# Patient Record
Sex: MALE | Race: WHITE | NOT HISPANIC OR LATINO | Employment: FULL TIME | URBAN - METROPOLITAN AREA
[De-identification: names, ages, dates, MRNs, and addresses within clinical notes are randomized per-mention and may not be internally consistent; named-entity substitution may affect disease eponyms.]

---

## 2020-03-26 ENCOUNTER — APPOINTMENT (OUTPATIENT)
Dept: RADIOLOGY | Facility: MEDICAL CENTER | Age: 40
End: 2020-03-26
Attending: NURSE PRACTITIONER
Payer: COMMERCIAL

## 2020-04-02 ENCOUNTER — HOSPITAL ENCOUNTER (OUTPATIENT)
Dept: RADIOLOGY | Facility: MEDICAL CENTER | Age: 40
End: 2020-04-02
Attending: NURSE PRACTITIONER
Payer: COMMERCIAL

## 2020-04-02 DIAGNOSIS — M54.50 LOW BACK PAIN, UNSPECIFIED BACK PAIN LATERALITY, UNSPECIFIED CHRONICITY, UNSPECIFIED WHETHER SCIATICA PRESENT: ICD-10-CM

## 2020-04-02 PROCEDURE — 72148 MRI LUMBAR SPINE W/O DYE: CPT

## 2024-02-29 ENCOUNTER — APPOINTMENT (OUTPATIENT)
Dept: RADIOLOGY | Facility: MEDICAL CENTER | Age: 44
End: 2024-02-29
Attending: PHYSICIAN ASSISTANT
Payer: MEDICAID

## 2024-02-29 DIAGNOSIS — M25.362 OTHER INSTABILITY, LEFT KNEE: ICD-10-CM

## 2024-02-29 PROCEDURE — 73721 MRI JNT OF LWR EXTRE W/O DYE: CPT | Mod: LT

## 2024-03-18 ENCOUNTER — APPOINTMENT (OUTPATIENT)
Dept: ADMISSIONS | Facility: MEDICAL CENTER | Age: 44
End: 2024-03-18
Attending: STUDENT IN AN ORGANIZED HEALTH CARE EDUCATION/TRAINING PROGRAM
Payer: MEDICAID

## 2024-03-20 ENCOUNTER — PRE-ADMISSION TESTING (OUTPATIENT)
Dept: ADMISSIONS | Facility: MEDICAL CENTER | Age: 44
End: 2024-03-20
Attending: STUDENT IN AN ORGANIZED HEALTH CARE EDUCATION/TRAINING PROGRAM
Payer: MEDICAID

## 2024-03-20 VITALS — BODY MASS INDEX: 31.6 KG/M2 | HEIGHT: 72 IN

## 2024-03-20 RX ORDER — ACETAMINOPHEN 500 MG
500-1000 TABLET ORAL PRN
COMMUNITY

## 2024-03-20 RX ORDER — IBUPROFEN 400 MG/1
400 TABLET ORAL PRN
COMMUNITY

## 2024-03-20 NOTE — PREPROCEDURE INSTRUCTIONS
PreAdmit Telephone Appointment: Reviewed the Preparing for your procedure handout with patient over the phone. Patient instructed per pharmacy guidelines regarding taking, holding or contacting provider for instructions on regularly prescribed medications before surgery. Instructed to take the following medications the day of surgery with a sip of water per pharmacy medication guidelines:   tylenol    Confirmed with patient where to check in morning of surgery. Handouts/Brochure/Video emailed to patient.

## 2024-03-25 ENCOUNTER — HOSPITAL ENCOUNTER (OUTPATIENT)
Facility: MEDICAL CENTER | Age: 44
End: 2024-03-25
Attending: STUDENT IN AN ORGANIZED HEALTH CARE EDUCATION/TRAINING PROGRAM | Admitting: STUDENT IN AN ORGANIZED HEALTH CARE EDUCATION/TRAINING PROGRAM
Payer: MEDICAID

## 2024-03-25 ENCOUNTER — ANESTHESIA (OUTPATIENT)
Dept: SURGERY | Facility: MEDICAL CENTER | Age: 44
End: 2024-03-25
Payer: MEDICAID

## 2024-03-25 ENCOUNTER — PHARMACY VISIT (OUTPATIENT)
Dept: PHARMACY | Facility: MEDICAL CENTER | Age: 44
End: 2024-03-25
Payer: COMMERCIAL

## 2024-03-25 ENCOUNTER — ANESTHESIA EVENT (OUTPATIENT)
Dept: SURGERY | Facility: MEDICAL CENTER | Age: 44
End: 2024-03-25
Payer: MEDICAID

## 2024-03-25 VITALS
HEIGHT: 72 IN | TEMPERATURE: 96.8 F | DIASTOLIC BLOOD PRESSURE: 76 MMHG | WEIGHT: 261.36 LBS | BODY MASS INDEX: 35.4 KG/M2 | OXYGEN SATURATION: 90 % | RESPIRATION RATE: 16 BRPM | SYSTOLIC BLOOD PRESSURE: 127 MMHG | HEART RATE: 65 BPM

## 2024-03-25 PROCEDURE — 160009 HCHG ANES TIME/MIN: Performed by: STUDENT IN AN ORGANIZED HEALTH CARE EDUCATION/TRAINING PROGRAM

## 2024-03-25 PROCEDURE — 160025 RECOVERY II MINUTES (STATS): Performed by: STUDENT IN AN ORGANIZED HEALTH CARE EDUCATION/TRAINING PROGRAM

## 2024-03-25 PROCEDURE — 160029 HCHG SURGERY MINUTES - 1ST 30 MINS LEVEL 4: Performed by: STUDENT IN AN ORGANIZED HEALTH CARE EDUCATION/TRAINING PROGRAM

## 2024-03-25 PROCEDURE — RXMED WILLOW AMBULATORY MEDICATION CHARGE: Performed by: STUDENT IN AN ORGANIZED HEALTH CARE EDUCATION/TRAINING PROGRAM

## 2024-03-25 PROCEDURE — 160002 HCHG RECOVERY MINUTES (STAT): Performed by: STUDENT IN AN ORGANIZED HEALTH CARE EDUCATION/TRAINING PROGRAM

## 2024-03-25 PROCEDURE — 160035 HCHG PACU - 1ST 60 MINS PHASE I: Performed by: STUDENT IN AN ORGANIZED HEALTH CARE EDUCATION/TRAINING PROGRAM

## 2024-03-25 PROCEDURE — 160041 HCHG SURGERY MINUTES - EA ADDL 1 MIN LEVEL 4: Performed by: STUDENT IN AN ORGANIZED HEALTH CARE EDUCATION/TRAINING PROGRAM

## 2024-03-25 PROCEDURE — 160048 HCHG OR STATISTICAL LEVEL 1-5: Performed by: STUDENT IN AN ORGANIZED HEALTH CARE EDUCATION/TRAINING PROGRAM

## 2024-03-25 PROCEDURE — 700111 HCHG RX REV CODE 636 W/ 250 OVERRIDE (IP): Mod: JZ | Performed by: ANESTHESIOLOGY

## 2024-03-25 PROCEDURE — 700101 HCHG RX REV CODE 250: Performed by: STUDENT IN AN ORGANIZED HEALTH CARE EDUCATION/TRAINING PROGRAM

## 2024-03-25 PROCEDURE — 160036 HCHG PACU - EA ADDL 30 MINS PHASE I: Performed by: STUDENT IN AN ORGANIZED HEALTH CARE EDUCATION/TRAINING PROGRAM

## 2024-03-25 PROCEDURE — 700105 HCHG RX REV CODE 258: Performed by: STUDENT IN AN ORGANIZED HEALTH CARE EDUCATION/TRAINING PROGRAM

## 2024-03-25 PROCEDURE — A9270 NON-COVERED ITEM OR SERVICE: HCPCS | Performed by: ANESTHESIOLOGY

## 2024-03-25 PROCEDURE — 700102 HCHG RX REV CODE 250 W/ 637 OVERRIDE(OP): Performed by: ANESTHESIOLOGY

## 2024-03-25 PROCEDURE — 160046 HCHG PACU - 1ST 60 MINS PHASE II: Performed by: STUDENT IN AN ORGANIZED HEALTH CARE EDUCATION/TRAINING PROGRAM

## 2024-03-25 PROCEDURE — 700101 HCHG RX REV CODE 250: Performed by: ANESTHESIOLOGY

## 2024-03-25 RX ORDER — MEPERIDINE HYDROCHLORIDE 25 MG/ML
12.5 INJECTION INTRAMUSCULAR; INTRAVENOUS; SUBCUTANEOUS
Status: DISCONTINUED | OUTPATIENT
Start: 2024-03-25 | End: 2024-03-25 | Stop reason: HOSPADM

## 2024-03-25 RX ORDER — OXYCODONE HYDROCHLORIDE 5 MG/1
TABLET ORAL
Qty: 17 TABLET | Refills: 0 | OUTPATIENT
Start: 2024-03-25

## 2024-03-25 RX ORDER — CEFAZOLIN SODIUM 1 G/3ML
INJECTION, POWDER, FOR SOLUTION INTRAMUSCULAR; INTRAVENOUS PRN
Status: DISCONTINUED | OUTPATIENT
Start: 2024-03-25 | End: 2024-03-25 | Stop reason: SURG

## 2024-03-25 RX ORDER — LIDOCAINE HYDROCHLORIDE 20 MG/ML
INJECTION, SOLUTION EPIDURAL; INFILTRATION; INTRACAUDAL; PERINEURAL PRN
Status: DISCONTINUED | OUTPATIENT
Start: 2024-03-25 | End: 2024-03-25 | Stop reason: SURG

## 2024-03-25 RX ORDER — ASPIRIN 81 MG/1
TABLET ORAL
Qty: 84 TABLET | Refills: 0 | OUTPATIENT
Start: 2024-03-25

## 2024-03-25 RX ORDER — BUPIVACAINE HYDROCHLORIDE AND EPINEPHRINE 2.5; 5 MG/ML; UG/ML
INJECTION, SOLUTION EPIDURAL; INFILTRATION; INTRACAUDAL; PERINEURAL
Status: DISCONTINUED | OUTPATIENT
Start: 2024-03-25 | End: 2024-03-25 | Stop reason: HOSPADM

## 2024-03-25 RX ORDER — LABETALOL HYDROCHLORIDE 5 MG/ML
5 INJECTION, SOLUTION INTRAVENOUS
Status: DISCONTINUED | OUTPATIENT
Start: 2024-03-25 | End: 2024-03-25 | Stop reason: HOSPADM

## 2024-03-25 RX ORDER — MELOXICAM 15 MG/1
TABLET ORAL
Qty: 7 TABLET | Refills: 0 | OUTPATIENT
Start: 2024-03-25

## 2024-03-25 RX ORDER — HYDROMORPHONE HYDROCHLORIDE 1 MG/ML
0.4 INJECTION, SOLUTION INTRAMUSCULAR; INTRAVENOUS; SUBCUTANEOUS
Status: DISCONTINUED | OUTPATIENT
Start: 2024-03-25 | End: 2024-03-25 | Stop reason: HOSPADM

## 2024-03-25 RX ORDER — HYDRALAZINE HYDROCHLORIDE 20 MG/ML
5 INJECTION INTRAMUSCULAR; INTRAVENOUS
Status: DISCONTINUED | OUTPATIENT
Start: 2024-03-25 | End: 2024-03-25 | Stop reason: HOSPADM

## 2024-03-25 RX ORDER — IPRATROPIUM BROMIDE AND ALBUTEROL SULFATE 2.5; .5 MG/3ML; MG/3ML
3 SOLUTION RESPIRATORY (INHALATION)
Status: DISCONTINUED | OUTPATIENT
Start: 2024-03-25 | End: 2024-03-25 | Stop reason: HOSPADM

## 2024-03-25 RX ORDER — ONDANSETRON 8 MG/1
TABLET, ORALLY DISINTEGRATING ORAL
Qty: 10 TABLET | Refills: 0 | OUTPATIENT
Start: 2024-03-25

## 2024-03-25 RX ORDER — ONDANSETRON 2 MG/ML
4 INJECTION INTRAMUSCULAR; INTRAVENOUS
Status: DISCONTINUED | OUTPATIENT
Start: 2024-03-25 | End: 2024-03-25 | Stop reason: HOSPADM

## 2024-03-25 RX ORDER — OXYCODONE HCL 5 MG/5 ML
5 SOLUTION, ORAL ORAL
Status: COMPLETED | OUTPATIENT
Start: 2024-03-25 | End: 2024-03-25

## 2024-03-25 RX ORDER — SODIUM CHLORIDE, SODIUM LACTATE, POTASSIUM CHLORIDE, CALCIUM CHLORIDE 600; 310; 30; 20 MG/100ML; MG/100ML; MG/100ML; MG/100ML
INJECTION, SOLUTION INTRAVENOUS CONTINUOUS
Status: DISCONTINUED | OUTPATIENT
Start: 2024-03-25 | End: 2024-03-25

## 2024-03-25 RX ORDER — KETOROLAC TROMETHAMINE 15 MG/ML
INJECTION, SOLUTION INTRAMUSCULAR; INTRAVENOUS PRN
Status: DISCONTINUED | OUTPATIENT
Start: 2024-03-25 | End: 2024-03-25 | Stop reason: SURG

## 2024-03-25 RX ORDER — HYDROMORPHONE HYDROCHLORIDE 1 MG/ML
0.2 INJECTION, SOLUTION INTRAMUSCULAR; INTRAVENOUS; SUBCUTANEOUS
Status: DISCONTINUED | OUTPATIENT
Start: 2024-03-25 | End: 2024-03-25 | Stop reason: HOSPADM

## 2024-03-25 RX ORDER — HALOPERIDOL 5 MG/ML
1 INJECTION INTRAMUSCULAR
Status: DISCONTINUED | OUTPATIENT
Start: 2024-03-25 | End: 2024-03-25 | Stop reason: HOSPADM

## 2024-03-25 RX ORDER — OXYCODONE HCL 5 MG/5 ML
10 SOLUTION, ORAL ORAL
Status: COMPLETED | OUTPATIENT
Start: 2024-03-25 | End: 2024-03-25

## 2024-03-25 RX ORDER — MIDAZOLAM HYDROCHLORIDE 1 MG/ML
1 INJECTION INTRAMUSCULAR; INTRAVENOUS
Status: DISCONTINUED | OUTPATIENT
Start: 2024-03-25 | End: 2024-03-25 | Stop reason: HOSPADM

## 2024-03-25 RX ORDER — SODIUM CHLORIDE, SODIUM LACTATE, POTASSIUM CHLORIDE, CALCIUM CHLORIDE 600; 310; 30; 20 MG/100ML; MG/100ML; MG/100ML; MG/100ML
INJECTION, SOLUTION INTRAVENOUS CONTINUOUS
Status: DISCONTINUED | OUTPATIENT
Start: 2024-03-25 | End: 2024-03-25 | Stop reason: HOSPADM

## 2024-03-25 RX ORDER — ONDANSETRON 2 MG/ML
INJECTION INTRAMUSCULAR; INTRAVENOUS PRN
Status: DISCONTINUED | OUTPATIENT
Start: 2024-03-25 | End: 2024-03-25 | Stop reason: SURG

## 2024-03-25 RX ORDER — DEXAMETHASONE SODIUM PHOSPHATE 4 MG/ML
INJECTION, SOLUTION INTRA-ARTICULAR; INTRALESIONAL; INTRAMUSCULAR; INTRAVENOUS; SOFT TISSUE PRN
Status: DISCONTINUED | OUTPATIENT
Start: 2024-03-25 | End: 2024-03-25 | Stop reason: SURG

## 2024-03-25 RX ORDER — BISACODYL 5 MG
TABLET, DELAYED RELEASE (ENTERIC COATED) ORAL
Qty: 4 TABLET | Refills: 0 | OUTPATIENT
Start: 2024-03-25

## 2024-03-25 RX ORDER — SENNOSIDES 8.6 MG
CAPSULE ORAL
Qty: 42 TABLET | Refills: 0 | OUTPATIENT
Start: 2024-03-25

## 2024-03-25 RX ORDER — HYDROMORPHONE HYDROCHLORIDE 1 MG/ML
0.1 INJECTION, SOLUTION INTRAMUSCULAR; INTRAVENOUS; SUBCUTANEOUS
Status: DISCONTINUED | OUTPATIENT
Start: 2024-03-25 | End: 2024-03-25 | Stop reason: HOSPADM

## 2024-03-25 RX ADMIN — FENTANYL CITRATE 100 MCG: 50 INJECTION, SOLUTION INTRAMUSCULAR; INTRAVENOUS at 12:24

## 2024-03-25 RX ADMIN — FENTANYL CITRATE 25 MCG: 50 INJECTION, SOLUTION INTRAMUSCULAR; INTRAVENOUS at 13:52

## 2024-03-25 RX ADMIN — ONDANSETRON 4 MG: 2 INJECTION INTRAMUSCULAR; INTRAVENOUS at 12:25

## 2024-03-25 RX ADMIN — OXYCODONE HYDROCHLORIDE 5 MG: 5 SOLUTION ORAL at 13:28

## 2024-03-25 RX ADMIN — LIDOCAINE HYDROCHLORIDE 100 MG: 20 INJECTION, SOLUTION EPIDURAL; INFILTRATION; INTRACAUDAL at 12:25

## 2024-03-25 RX ADMIN — KETOROLAC TROMETHAMINE 15 MG: 15 INJECTION, SOLUTION INTRAMUSCULAR; INTRAVENOUS at 12:59

## 2024-03-25 RX ADMIN — CEFAZOLIN 3 G: 1 INJECTION, POWDER, FOR SOLUTION INTRAMUSCULAR; INTRAVENOUS at 12:29

## 2024-03-25 RX ADMIN — SODIUM CHLORIDE, POTASSIUM CHLORIDE, SODIUM LACTATE AND CALCIUM CHLORIDE: 600; 310; 30; 20 INJECTION, SOLUTION INTRAVENOUS at 11:12

## 2024-03-25 RX ADMIN — DEXAMETHASONE SODIUM PHOSPHATE 8 MG: 4 INJECTION INTRA-ARTICULAR; INTRALESIONAL; INTRAMUSCULAR; INTRAVENOUS; SOFT TISSUE at 12:25

## 2024-03-25 RX ADMIN — PROPOFOL 250 MG: 10 INJECTION, EMULSION INTRAVENOUS at 12:25

## 2024-03-25 ASSESSMENT — PAIN DESCRIPTION - PAIN TYPE
TYPE: CHRONIC PAIN
TYPE: SURGICAL PAIN

## 2024-03-25 ASSESSMENT — PAIN SCALES - GENERAL: PAIN_LEVEL: 0

## 2024-03-25 NOTE — ANESTHESIA POSTPROCEDURE EVALUATION
Patient: Arnold Ahn    Procedure Summary       Date: 03/25/24 Room / Location:  OR 06 / SURGERY [facility]    Anesthesia Start: 1224 Anesthesia Stop: 1313    Procedures:       LEFT KNEE ARTHROSCOPY, PARTIAL MENISCECTOMY, CHONDROPLASTY (Left: Knee)      CHONDROPLASTY (Left: Knee) Diagnosis: (TEAR OF MENISCUS OF KNEE)    Surgeons: Austen Senior M.D. Responsible Provider: Gabriele Petty M.D.    Anesthesia Type: general ASA Status: 2            Final Anesthesia Type: general  Last vitals  BP   Blood Pressure: 104/64    Temp   36.2 °C (97.2 °F)    Pulse   63   Resp   13    SpO2   94 %      Anesthesia Post Evaluation    Patient location during evaluation: PACU  Patient participation: complete - patient participated  Level of consciousness: awake and alert  Pain score: 0    Airway patency: patent  Anesthetic complications: no  Cardiovascular status: hemodynamically stable  Respiratory status: acceptable  Hydration status: euvolemic    PONV: none          There were no known notable events for this encounter.     Nurse Pain Score: 0 (NPRS)

## 2024-03-25 NOTE — OR NURSING
1307: [place] Report received from anesthesia and RN. Respirations are spontaneous and unlabored. VSS on 6L. Dressing is clean, dry and intact. Cold pack applied. LLE elevated under ankle. LLE; pedal pulse is 2+, cap refill less than 3 seconds, warm pink.     1315: Patient denies pain or nausea.     1328: Patient medicated for 5/10 pain, see MAR.     1330: Report to Arnie DONATO.    1400: Report form Arnie DONATO. Patient reports pain is tolerable. Denies nausea.    1424: Recovery completed at this time. Patient meets criteria for transfer to stage 2. Report given to RN.    1425: CNA at bedside to transfer patient to stage 2.

## 2024-03-25 NOTE — ANESTHESIA TIME REPORT
Anesthesia Start and Stop Event Times       Date Time Event    3/25/2024 1217 Ready for Procedure     1224 Anesthesia Start     1313 Anesthesia Stop          Responsible Staff  03/25/24      Name Role Begin End    Gabriele Petty M.D. Anesth 1224 1313          Overtime Reason:  no overtime (within assigned shift)    Comments:

## 2024-03-25 NOTE — DISCHARGE INSTRUCTIONS
What to Expect Post Anesthesia    Rest and take it easy for the first 24 hours.  A responsible adult is recommended to remain with you during that time.  It is normal to feel sleepy.  We encourage you to not do anything that requires balance, judgment or coordination.    FOR 24 HOURS DO NOT:  Drive, operate machinery or run household appliances.  Drink beer or alcoholic beverages.  Make important decisions or sign legal documents.    To avoid nausea, slowly advance diet as tolerated, avoiding spicy or greasy foods for the first day.  Add more substantial food to your diet according to your provider's instructions.  Babies can be fed formula or breast milk as soon as they are hungry.  INCREASE FLUIDS AND FIBER TO AVOID CONSTIPATION.    MILD FLU-LIKE SYMPTOMS ARE NORMAL.  YOU MAY EXPERIENCE GENERALIZED MUSCLE ACHES, THROAT IRRITATION, HEADACHE AND/OR SOME NAUSEA.    If any questions arise, call your provider.  If your provider is not available, please feel free to call the Surgical Center at [phone].    MEDICATIONS: Resume taking daily medication.  Take prescribed pain medication with food.  If no medication is prescribed, you may take non-aspirin pain medication if needed.  PAIN MEDICATION CAN BE VERY CONSTIPATING.  Take a stool softener or laxative such as senokot, pericolace, or milk of magnesia if needed.      Last pain medication (5mg Oxycodone) given at: 1:30 pm       Activity is to be weight bearing as tolerated.  Ice and elevate.  Keep dressings clean and dry.  You may remove dressings and shower in 4 days (Friday)  PLEASE REFER TO DR. DWYER'S HANDOUT FOR FURTHER INSTRUCTIONS

## 2024-03-25 NOTE — OP REPORT
OPERATIVE NOTE     DATE OF PROCEDURE: 3/25/2024           PRE-OP DIAGNOSIS:  1.  Left knee medial meniscus tear  2.  Left knee chondromalacia medial compartment  3.  Left knee chondromalacia lateral compartment  4.  Left knee chondromalacia patellofemoral joint space           POST-OP DIAGNOSIS: same           PROCEDURE:  1.  Left knee diagnostic arthroscopy  2.  Left knee partial medial meniscectomy  3.  Left knee chondroplasty medial tibial plateau  4.  Left knee chondroplasty lateral tibial plateau  5.  Left knee chondroplasty patella           SURGEON: Austen Senior M.D. - Primary           ASSISTANT: Stephanie Cardona, certified first assist    assistant was required for critical portions of the case including patient positioning manipulation possibly graft preparation retraction suture management wound closure as well as other critical portions of the case.  I be unable to perform these portion of the case by myself there were no other certified first assist available    ANESTHESIA: General           ESTIMATED BLOOD LOSS: Minimal                  SPECIMENS: None           COMPLICATIONS: None stable           CONDITION: Stable           OPERATIVE INDICATIONS AND DESCRIPTION OF PROCEDURE:     I met the patient in the preoperative holding area.  I had a full discussion with them regarding multiple options for the patient's condition including nonoperative management.  Again today I offered them nonoperative treatment modalities.  Regarding operative options I specifically I outlined left knee arthroscopy chondroplasty meniscectomies as indicated. I discussed some possible complications including bleeding possibly requiring transfusion, infection, neurovascular damage, malunion, nonunion, failure of implants, failure of surgery, chronic pain, need for revision surgery, instability, limb length discrepancy, prolonged rehab, weight bearing restrictions, DVT, PE, MI, stroke and death. After going over risks  and benefits making no promises either guaranteed or implied patient elected to proceed.  At this point informed consent was signed.  A surgical marking pen was used to place my initials on the patient's left knee.    Patient was brought back to the operating room.  They were placed supine on a regular table all bony prominences padded very well to prevent neuropraxia's.  They were secured to the table with a strap.  General anesthesia was introduced.  Lateral post were used for for positioning.  At this point a  tourniquet was placed but never inflated and then the left lower extremity was prepped in sterile standard fashion.  At this point a timeout was performed with all parties in the room ceasing activity. Informed consent sheet was visible confirming the patient's name date of birth MRN and matched their wristband. Antibiotics Ancef were confirmed and the left lower extremity was confirmed as the correct surgical site.  My surgical initials were visible on this extremity.  At this point we were cleared to proceed with the procedure.    Exam under anesthesia revealed full range of motion stable varus valgus stress stable Lachman and posterior drawer    Introduced 5 cc into her anteromedial anterolateral planned incisions.  20 cc cortisone bupivacaine introduced in joint space to aid with hemostasis    We then began by establishing anterior lateral portal.  We then established an anterior medial portal under needle localization.  We then performed a diagnostic arthroscopy.  Those results were as follows    Arthroscopic findings:  Patellofemoral joint: Focal central grade 3/4 change 1 x 1 cm of the patella, trochlear chondral surface intact  Medial compartment: Complex degenerative type tear.  Horizontal cleavage tear in the posterior horn incarcerated fragment in the middle horn.  This involved the white white and red-white junction.  Some focal fissuring down to subchondral bone grade 3 change tibial plateau  weightbearing surface  Notch: ACL PCL intact  Lateral compartment: Some focal fissuring down to subchondral bone weightbearing portion tibial plateau femoral condyle chondral surface intact no meniscus tears  Gutters: Popliteus intact no loose bodies    Began by perform partial medial meniscectomy.  Using basket biter and intermittently motorized shaver the meniscus tear was debrided back to healthy stable tissue.  We attempted leave as much meniscus as possible proximal 50% of the middle horn and posterior horn removed.  Free fragments removed with the motorized shaver was probed and stable.  While was in this compartment I used a motorized shaver to debride the fissuring back to healthy stable tissue again leaving as much chondral surface possible only a superficial amount was removed to prevent further propagation    Turned our attention lateral compartment.  Meniscus was probed and stable.  I turned motorize shaver to the full-thickness chondral fissuring weightbearing portion tibial plateau as it did appear to be unstable.  This was debrided back to healthy stable tissue again attempting to leave as much chondral surface as possible.  Was probed and stable    Turned my attention to the patella.  Again given the unstable nature of the chondral lesion I used a motorized shaver debride this back to healthy stable edges.  I then used the edge of the electrocautery wand and a Coblation manner to seal the edges.  At this point the cartilage was probed and stable    Wound was copiously irrigated.  All wounds were closed in interrupted layered fashion.  Sterile bandages were placed.  Patient was woken from general anesthesia taken to PACU in stable condition counts were correct x 2    POSTOPERATIVE PLAN:  Weight-bearing: As tolerated  DVT prophylaxis: Aspirin 81 twice daily 6 weeks  Antibiotics: Preop  Showering: Okay to shower postop day 4, no lotions no scrubbing's, place new dressing.  No tub soaks baths or  swimming  Prescriptions: have been E prescribed  Follow-up:  in 2 weeks.  Call with any questions or concerns patient as well as his significant other have been counseled on signs of infection and to call immediately if they develop any of these  Physical Therapy: Not indicated    Did call the patient's family to discuss the surgery with them.  Counseled them on signs of infection which they understood.  If they have any issues including any concerns with infection including redness drainage swelling pain they are to call our office immediately or go to the local emergency department    This is dictated with voice recognition software please excuse any errors

## 2024-03-25 NOTE — OR NURSING
1441: report received from Arnie DONATO. Pt resting in recliner. Family at bedside. Dressing CDI. VSS on RA. [id number] given to sons.     1450: Patient education completed, family denies further questions. IV removed with tip intact. DC'd to care of family post uneventful stay in PACU 2.

## 2024-03-25 NOTE — ANESTHESIA PREPROCEDURE EVALUATION
Case: [id number] Date/Time: 03/25/24 1215    Procedures:       LEFT KNEE ARTHROSCOPY, PARTIAL MENISCECTOMY, CHONDROPLASTY      CHONDROPLASTY    Pre-op diagnosis: TEAR OF MENISCUS OF KNEE    Location: [facility] OR 06 / SURGERY [facility]    Surgeons: Austen Senior M.D.            Denies angina, dyspnea, symptomatic GERD.     No new concerning health changes/symptoms.     No issues with prior anesthetics.     NPO.    Relevant Problems   No relevant active problems       Physical Exam    Airway   Mallampati: II  TM distance: >3 FB  Neck ROM: full       Cardiovascular - normal exam  Rhythm: regular  Rate: normal  (-) murmur     Dental - normal exam        Facial Hair   Pulmonary - normal exam  Breath sounds clear to auscultation     Abdominal    Neurological - normal exam                   Anesthesia Plan    ASA 2       Plan - general       Airway plan will be LMA          Induction: intravenous    Postoperative Plan: Postoperative administration of opioids is intended.    Pertinent diagnostic labs and testing reviewed    Informed Consent:    Anesthetic plan and risks discussed with patient.    Use of blood products discussed with: patient whom consented to blood products.

## 2024-03-25 NOTE — OR NURSING
1010 PT TO PRE OP TO ASSUME CARE    1110 Patient allergies and NPO status verified, home medication reconciliation completed and belongings secured. Patient verbalizes understanding of pain scale, expected course of stay and plan of care. Surgical site verified with patient. IV access established. Sequentials placed on R leg.

## 2024-03-25 NOTE — ANESTHESIA PROCEDURE NOTES
Airway    Date/Time: 3/25/2024 12:28 PM    Performed by: Gabriele Petty M.D.  Authorized by: Gabriele Petty M.D.    Location:  OR  Urgency:  Elective  Indications for Airway Management:  Anesthesia      Spontaneous Ventilation: absent    Sedation Level:  Deep  Preoxygenated: Yes    Mask Difficulty Assessment:  0 - not attempted  Final Airway Type:  Supraglottic airway  Final Supraglottic Airway:  Standard LMA    SGA Size:  5  Number of Attempts at Approach:  1

## (undated) DEVICE — STOCKINETTE IMPERVIOUS 6 SM (30EA/CA)

## (undated) DEVICE — DRAPE U ORTHOPEDIC - (10/BX)

## (undated) DEVICE — ELECTRODE DUAL RETURN W/ CORD - (50/PK)

## (undated) DEVICE — Device

## (undated) DEVICE — DRAPE ARTHROSCOPE (ACL) - (10/CA)

## (undated) DEVICE — SODIUM CHL. IRRIGATION 0.9% 3000ML (4EA/CA 65CA/PF)

## (undated) DEVICE — NEEDLE SAFETY 18 GA X 1 1/2 IN (100EA/BX)

## (undated) DEVICE — SUTURE 3-0 ETHILON FS-1 - (36/BX) 30 INCH

## (undated) DEVICE — SUTURE GENERAL

## (undated) DEVICE — DRAPE LARGE 3 QUARTER - (20/CA)

## (undated) DEVICE — GLOVE BIOGEL INDICATOR SZ 8 SURGICAL PF LTX - (50/BX 4BX/CA)

## (undated) DEVICE — ABLATOR WAND SERFAS 90-S CRUISE

## (undated) DEVICE — TOWEL STOP TIMEOUT SAFETY FLAG (40EA/CA)

## (undated) DEVICE — TUBING PATIENT W/CONNECTOR REDEUCE (1EA)

## (undated) DEVICE — PAD PREP 24 X 48 CUFFED - (100/CA)

## (undated) DEVICE — GLOVE SURGICAL PROTEXIS PI 8.0 LF - (50PR/BX)

## (undated) DEVICE — TUBE CONNECTING SUCTION - CLEAR PLASTIC STERILE 72 IN (50EA/CA)

## (undated) DEVICE — COVER LIGHT HANDLE FLEXIBLE - SOFT (2EA/PK 80PK/CA)

## (undated) DEVICE — PACK KNEE ARTHROSCOPY SM OR - (2EA/CA)

## (undated) DEVICE — DRAPE U SPLIT IMP 54 X 76 - (24/CA)

## (undated) DEVICE — SENSOR OXIMETER ADULT SPO2 RD SET (20EA/BX)

## (undated) DEVICE — TUBING PUMP WITH CONNECTOR REDEUCE (1EA)